# Patient Record
Sex: FEMALE | Race: WHITE
[De-identification: names, ages, dates, MRNs, and addresses within clinical notes are randomized per-mention and may not be internally consistent; named-entity substitution may affect disease eponyms.]

---

## 2019-09-08 ENCOUNTER — HOSPITAL ENCOUNTER (INPATIENT)
Dept: HOSPITAL 4 - SED | Age: 44
LOS: 2 days | Discharge: HOME | DRG: 74 | End: 2019-09-10
Attending: STUDENT IN AN ORGANIZED HEALTH CARE EDUCATION/TRAINING PROGRAM | Admitting: STUDENT IN AN ORGANIZED HEALTH CARE EDUCATION/TRAINING PROGRAM
Payer: COMMERCIAL

## 2019-09-08 VITALS — WEIGHT: 200 LBS | BODY MASS INDEX: 31.39 KG/M2 | HEIGHT: 67 IN

## 2019-09-08 VITALS — SYSTOLIC BLOOD PRESSURE: 155 MMHG

## 2019-09-08 VITALS — SYSTOLIC BLOOD PRESSURE: 143 MMHG

## 2019-09-08 VITALS — SYSTOLIC BLOOD PRESSURE: 137 MMHG

## 2019-09-08 DIAGNOSIS — F19.10: ICD-10-CM

## 2019-09-08 DIAGNOSIS — F10.10: ICD-10-CM

## 2019-09-08 DIAGNOSIS — R65.10: ICD-10-CM

## 2019-09-08 DIAGNOSIS — G90.8: Primary | ICD-10-CM

## 2019-09-08 DIAGNOSIS — F15.10: ICD-10-CM

## 2019-09-08 DIAGNOSIS — D50.9: ICD-10-CM

## 2019-09-08 DIAGNOSIS — E87.2: ICD-10-CM

## 2019-09-08 DIAGNOSIS — E86.0: ICD-10-CM

## 2019-09-08 DIAGNOSIS — I95.1: ICD-10-CM

## 2019-09-08 DIAGNOSIS — E66.9: ICD-10-CM

## 2019-09-08 DIAGNOSIS — E87.6: ICD-10-CM

## 2019-09-08 LAB
ALBUMIN SERPL BCP-MCNC: 4.1 G/DL (ref 3.4–4.8)
ALT SERPL W P-5'-P-CCNC: 26 U/L (ref 12–78)
AMPHETAMINES UR QL SCN: POSITIVE
AMYLASE SERPL-CCNC: 61 U/L (ref 0–100)
ANION GAP SERPL CALCULATED.3IONS-SCNC: 17 MMOL/L (ref 5–15)
APAP SERPL-MCNC: < 1 UG/ML (ref 1–30)
APPEARANCE UR: CLEAR
AST SERPL W P-5'-P-CCNC: 23 U/L (ref 10–37)
BACTERIA URNS QL MICRO: (no result) /HPF
BARBITURATES UR QL SCN: NEGATIVE
BASOPHILS # BLD AUTO: 0.1 K/UL (ref 0–0.2)
BASOPHILS NFR BLD AUTO: 0.3 % (ref 0–2)
BENZODIAZ UR QL SCN: NEGATIVE
BILIRUB SERPL-MCNC: 0.3 MG/DL (ref 0–1)
BILIRUB UR QL STRIP: NEGATIVE
BUN SERPL-MCNC: 5 MG/DL (ref 8–21)
BZE UR QL SCN: NEGATIVE
CALCIUM SERPL-MCNC: 9.4 MG/DL (ref 8.4–11)
CANNABINOIDS UR QL SCN: NEGATIVE
CHLORIDE SERPL-SCNC: 104 MMOL/L (ref 98–107)
COLOR UR: YELLOW
CREAT SERPL-MCNC: 0.8 MG/DL (ref 0.55–1.3)
EOSINOPHIL # BLD AUTO: 0.1 K/UL (ref 0–0.4)
EOSINOPHIL NFR BLD AUTO: 0.7 % (ref 0–4)
ERYTHROCYTE [DISTWIDTH] IN BLOOD BY AUTOMATED COUNT: 19.5 % (ref 9–15)
ETHANOL SERPL-MCNC: 51 MG/DL (ref ?–10)
GFR SERPL CREATININE-BSD FRML MDRD: 101 ML/MIN (ref 90–?)
GLUCOSE SERPL-MCNC: 102 MG/DL (ref 70–99)
GLUCOSE UR STRIP-MCNC: NEGATIVE MG/DL
HCG UR QL: NEGATIVE
HCT VFR BLD AUTO: 34.7 % (ref 36–48)
HGB BLD-MCNC: 10.7 G/DL (ref 12–16)
HGB UR QL STRIP: (no result)
KETONES UR STRIP-MCNC: NEGATIVE MG/DL
LEUKOCYTE ESTERASE UR QL STRIP: NEGATIVE
LIPASE SERPL-CCNC: 172 U/L (ref 73–393)
LYMPHOCYTES # BLD AUTO: 2.5 K/UL (ref 1–5.5)
LYMPHOCYTES NFR BLD AUTO: 14.8 % (ref 20.5–51.5)
MCH RBC QN AUTO: 22 PG (ref 27–31)
MCHC RBC AUTO-ENTMCNC: 31 % (ref 32–36)
MCV RBC AUTO: 71 FL (ref 79–98)
METHADONE UR-SCNC: NEGATIVE UMOL/L
METHAMPHET UR-SCNC: POSITIVE UMOL/L
MONOCYTES # BLD MANUAL: 0.7 K/UL (ref 0–1)
MONOCYTES # BLD MANUAL: 4.1 % (ref 1.7–9.3)
NEUTROPHILS # BLD AUTO: 13.7 K/UL (ref 1.8–7.7)
NEUTROPHILS NFR BLD AUTO: 80.1 % (ref 40–70)
NITRITE UR QL STRIP: NEGATIVE
OPIATES UR QL SCN: NEGATIVE
OXYCODONE SERPL-MCNC: NEGATIVE NG/ML
PCP UR QL SCN: NEGATIVE
PH UR STRIP: 7 [PH] (ref 5–8)
PLATELET # BLD AUTO: 358 K/UL (ref 130–430)
POTASSIUM SERPL-SCNC: 3.5 MMOL/L (ref 3.5–5.1)
PROT UR QL STRIP: NEGATIVE
RBC # BLD AUTO: 4.92 MIL/UL (ref 4.2–6.2)
RBC #/AREA URNS HPF: (no result) /HPF (ref 0–3)
SODIUM SERPLBLD-SCNC: 141 MMOL/L (ref 136–145)
SP GR UR STRIP: <1.005 (ref 1–1.03)
TRICYCLICS UR-MCNC: NEGATIVE NG/ML
URINE PROPOXYPHENE SCREEN: NEGATIVE
UROBILINOGEN UR STRIP-MCNC: 0.2 MG/DL (ref 0.2–1)
WBC # BLD AUTO: 17.1 K/UL (ref 4.8–10.8)
WBC #/AREA URNS HPF: (no result) /HPF (ref 0–3)

## 2019-09-08 PROCEDURE — G0378 HOSPITAL OBSERVATION PER HR: HCPCS

## 2019-09-08 PROCEDURE — G0480 DRUG TEST DEF 1-7 CLASSES: HCPCS

## 2019-09-08 PROCEDURE — G0481 DRUG TEST DEF 8-14 CLASSES: HCPCS

## 2019-09-08 PROCEDURE — G0482 DRUG TEST DEF 15-21 CLASSES: HCPCS

## 2019-09-08 RX ADMIN — ACETAMINOPHEN PRN MG: 325 TABLET ORAL at 18:07

## 2019-09-08 RX ADMIN — CEFTRIAXONE SODIUM SCH MLS/HR: 1 INJECTION, SOLUTION INTRAVENOUS at 21:49

## 2019-09-08 NOTE — NUR
CLOSING NOTES:

PATIENT AWAKE IN BED. ALERT AND ORIENTED x4 AND VERBAL. FAMILY AT BEDSIDE. NO SIGNS OF 
DISTRESS OR SHORTNESS OF BREATH NOTED. PATIENT IS TOLERATING OXYGEN AT ROOM AIR. SAFETY AND 
FALL PRECAUTIONS ARE IN PLACE. BED LOCKED IN LOWEST POSITION WITH CALL LIGHT IN REACH. WILL 
ENDORSE PATIENT  TO ONCOMING NIGHT SHIFT NURSE.

## 2019-09-08 NOTE — NUR
-------------------------------------------------------------------------------

           *** Note undone in [facility] - 09/08/19 at 1624 by [email or web address] ***            

-------------------------------------------------------------------------------

patient is othostatic positive

## 2019-09-08 NOTE — NUR
Patient will be admitted to care of [facility].  Admitted to tele unit.  Will go 
to room 117A.  Belongings list completed.  Summary report printed. Report will 
be given at bedside.

## 2019-09-08 NOTE — NUR
patient AOx4 with c/o dizziness, patient states she feels lightheaded/numbness 
to extremities upon standing  since 10:30 this morning. patient denies recent 
fall, n/v/d/HA of any kind. patient states she smokes x1 pack a day. patient 
denies any other drugs at this time. patient has equal keke strangth to all 
extremities. no slurred speech of any kind. no other complaint or injury at 
this time.

## 2019-09-08 NOTE — NUR
Medicated per MD orders. IVF infusing with no s/s of infiltration at this time. 
Will cont to monitor# 18 gauge angiocath placed to LAC.  Use of asceptic 
technique.  Opsite placed over site.  Blood return noted.  Blood for lab drawn 
from site.  Flushed with 10 cc of normal saline.  No evidence of infiltration 
noted.  Patient tolerated well.

## 2019-09-08 NOTE — NUR
ADMISSION NOTE

Received patient from ER via rachel, received report from Claudia GORDON. Patient admitted 
with diagnosis of ORTHOSTATIC HYPOTENSION/DEHYDRATION. Patient oriented to hospital routine, 
call light, toileting and safety-patient verbalized understanding.

## 2019-09-09 VITALS — SYSTOLIC BLOOD PRESSURE: 160 MMHG

## 2019-09-09 VITALS — SYSTOLIC BLOOD PRESSURE: 149 MMHG

## 2019-09-09 VITALS — SYSTOLIC BLOOD PRESSURE: 146 MMHG

## 2019-09-09 VITALS — SYSTOLIC BLOOD PRESSURE: 123 MMHG

## 2019-09-09 VITALS — SYSTOLIC BLOOD PRESSURE: 140 MMHG

## 2019-09-09 VITALS — SYSTOLIC BLOOD PRESSURE: 145 MMHG

## 2019-09-09 LAB
ANION GAP SERPL CALCULATED.3IONS-SCNC: 13 MMOL/L (ref 5–15)
BASOPHILS # BLD AUTO: 0.1 K/UL (ref 0–0.2)
BASOPHILS NFR BLD AUTO: 0.6 % (ref 0–2)
BUN SERPL-MCNC: 6 MG/DL (ref 8–21)
CALCIUM SERPL-MCNC: 8.4 MG/DL (ref 8.4–11)
CHLORIDE SERPL-SCNC: 109 MMOL/L (ref 98–107)
CHOLEST SERPL-MCNC: 150 MG/DL (ref ?–200)
CREAT SERPL-MCNC: 0.75 MG/DL (ref 0.55–1.3)
EOSINOPHIL # BLD AUTO: 0.4 K/UL (ref 0–0.4)
EOSINOPHIL NFR BLD AUTO: 4 % (ref 0–4)
ERYTHROCYTE [DISTWIDTH] IN BLOOD BY AUTOMATED COUNT: 19.8 % (ref 9–15)
GFR SERPL CREATININE-BSD FRML MDRD: 109 ML/MIN (ref 90–?)
GLUCOSE SERPL-MCNC: 90 MG/DL (ref 70–99)
HCT VFR BLD AUTO: 29.9 % (ref 36–48)
HDLC SERPL-MCNC: 36 MG/DL (ref 55–?)
HGB BLD-MCNC: 9.2 G/DL (ref 12–16)
LDL CHOLESTEROL: 95 MG/DL (ref ?–100)
LYMPHOCYTES # BLD AUTO: 3 K/UL (ref 1–5.5)
LYMPHOCYTES NFR BLD AUTO: 29.5 % (ref 20.5–51.5)
MCH RBC QN AUTO: 22 PG (ref 27–31)
MCHC RBC AUTO-ENTMCNC: 31 % (ref 32–36)
MCV RBC AUTO: 71 FL (ref 79–98)
MONOCYTES # BLD MANUAL: 0.6 K/UL (ref 0–1)
MONOCYTES # BLD MANUAL: 5.6 % (ref 1.7–9.3)
NEUTROPHILS # BLD AUTO: 6 K/UL (ref 1.8–7.7)
NEUTROPHILS NFR BLD AUTO: 60.3 % (ref 40–70)
PHOSPHATE SERPL-MCNC: 4.2 MG/DL (ref 2.7–4.5)
PLATELET # BLD AUTO: 285 K/UL (ref 130–430)
POTASSIUM SERPL-SCNC: 3.4 MMOL/L (ref 3.5–5.1)
RBC # BLD AUTO: 4.2 MIL/UL (ref 4.2–6.2)
SODIUM SERPLBLD-SCNC: 143 MMOL/L (ref 136–145)
TIBC SERPL-MCNC: 438 UG/DL (ref 250–450)
TRIGL SERPL-MCNC: 131 MG/DL (ref 30–150)
WBC # BLD AUTO: 10 K/UL (ref 4.8–10.8)

## 2019-09-09 RX ADMIN — SODIUM CHLORIDE SCH MLS/HR: 9 INJECTION, SOLUTION INTRAVENOUS at 09:26

## 2019-09-09 RX ADMIN — ACETAMINOPHEN PRN MG: 325 TABLET ORAL at 00:43

## 2019-09-09 RX ADMIN — Medication SCH TAB: at 09:25

## 2019-09-09 RX ADMIN — SODIUM CHLORIDE SCH MLS/HR: 9 INJECTION, SOLUTION INTRAVENOUS at 09:25

## 2019-09-09 RX ADMIN — MULTIVITAMIN TABLET SCH TAB: TABLET at 09:25

## 2019-09-09 RX ADMIN — ACETAMINOPHEN PRN MG: 325 TABLET ORAL at 15:04

## 2019-09-09 RX ADMIN — ACETAMINOPHEN PRN MG: 325 TABLET ORAL at 22:22

## 2019-09-09 RX ADMIN — CEFTRIAXONE SODIUM SCH MLS/HR: 1 INJECTION, SOLUTION INTRAVENOUS at 20:28

## 2019-09-09 NOTE — NUR
RN ROUNDS: 

PATIENT IS AWAKE AND ALERT IN BED. PATIENT DENIES ANY PAIN AT THE MOMENT. NO SIGNS OF 
DISTRESS OR SHORTNESS OF BREATH NOTED. IV SITE PATENT WITH NO SIGNS OF INFILTRATION. PATIENT 
IN STABLE CONDITION. SAFETY PRECAUTIONS ARE IN PLACE. WILL CONTINUE TO MONITOR PATIENT FOR 
ANY CHANGES.

## 2019-09-09 NOTE — NUR
RN ROUNDS:

PATIENT JUST CAME BACK IN FROM SMOKING OUTSIDE. PATIENT IS AWAKE IN BED. IV SITE IS NO 
LONGER PATENT. IV CATHETER INTACT WITH NO ACTIVE BLEEDING NOTED. WILL ATTEMPT TO PUT ANOTHER 
IV IN. PATIENT DENIES ANY PAIN AT THE MOMENT. NO SIGNS OF DISTRESS OR SHORTNESS OF BREATH 
NOTED. WILL CONTINUE TO MONITOR PATIENT FOR ANY CHANGES.

## 2019-09-09 NOTE — NUR
Pt back from smoking area. Pt in bed. No signs of acute distress noted. Call light within 
reach. Will continue to monitor.

## 2019-09-09 NOTE — NUR
RN ROUNDS:

PATIENT IS ASLEEP IN BED. NO SIGNS OF DISTRESS OR SHORTNESS OF BREATH NOTED. PATIENT IN 
STABLE CONDITION. WILL CONTINUE TO MONITOR PATIENT FOR ANY CHANGES.

## 2019-09-09 NOTE — NUR
Patient Reminded 

 again that stool sample is needed , Hat placed in Rest room patient alert & awake .

## 2019-09-09 NOTE — NUR
RN ROUNDS:

PATIENT IS AWAKE IN BED EATING LUNCH. FAMILY AT BEDSIDE. PATIENT DENIES ANY PAIN AT THE 
MOMENT. NO SIGNS OF DISTRESS OR SHORTNESS OF BREATH NOTED. PATIENT IN STABLE CONDITION. WILL 
CONTINUE TO MONITOR PATIENT FOR ANY CHANGES.

## 2019-09-09 NOTE — NUR
Hourly Rounding 

 patient Resting HOB elevated verbally Responsive friend at the bedside chest movement 
symmetrical call bell with patient .

## 2019-09-09 NOTE — NUR
Patient ambulates with steady gait 

 BRP did urinate as needed , back to bed no SOB call bell with patient activity tolerated .

## 2019-09-09 NOTE — NUR
ROCEPHIN 1 GM IVPB 

 administer as ordered no s/sx of ALLERGIC reaction noted skin Rash Free dry also warm .

## 2019-09-09 NOTE — NUR
IV REINSERTION:

IV SUCCESSFUL IN RIGHT FOREARM 22G. ASEPTIC TECHNIQUE USED. SUCCESSFUL AFTER THREE ATTEMPTS. 
PATIENT TOLERATED IT WELL. IV SITE PATENT WITH NO SIGNS OF INFILTRATION AND FLUSHES. NO 
ACTIVE BLEEDING NOTED. RUNNING IV FLUIDS AS ORDERED. WILL CONTINUE TO MONITOR.

## 2019-09-09 NOTE — NUR
: Pt. positive for etho, meth, and amphetamines. 

MSW met with pt. bedside, introduced self and asked pt. is she would like to continue with 
this interview and the asking of some personal questions with her two visitors in the room. 
Pt. stated she was fine and allowed them to stay. They were her two sisters, Patricia Molina 
[phone] and Pooja Avendaño [phone]. Pt. 



Pt. was able to easily participate in this interview. She stated she was at hospital due to 
feeling dizzy. She stated when asked, "there were no other reasons and the Dr. is trying to 
find out why".  Pt. stated she lives at home with her . She has four kids ages 
24,23,21 and 15 years. Pt. works at [employer] and stated she has the support of her family. MSW 
went through a discharge summary and continued to ask additional questions.



MSW shared with pt. her labs came back positive for the etoh, meth and amphet. Pt. stated 
she only drank 10 beers and smoked pot. When asked pt. stated she had never been Dx. with 
any type of mental health issues. She stated she has felt depressed at times, but that it 
has not been an issue. Pt. stated it was ok for MSW to leave some resources such as 
Substance Abuse and added her insurance can activate mental health services. MSW added,  her 
therapist by law has to keep the info she shares private and not share with anyone.  Lastly, 
it was explained to pt. what a durable power of attny was and a form was left with her. Pt. 
thanked MSW.

## 2019-09-09 NOTE — NUR
CLOSING ROUNDS:

PATIENT IS AWAKE AND ALERT x4 IN BED. FAMILY AT BEDSIDE. PATIENT DENIES ANY PAIN AT THE 
MOMENT. NO SIGNS OF DISTRESS OR SHORTNESS OF BREATH NOTED. IV SITE IS PATENT WITH NO SIGNS 
OF INFILTRATION. PATIENT IN STABLE CONDITION. SAFETY AND FALL PRECAUTIONS ARE IN PLACE. BED 
LOCKED IN LOWEST POSITION WITH CALL LIGHT IN REACH. WILL ENDORSE PATIENT TO NIGHT SHIFT 
NURSE.

## 2019-09-09 NOTE — NUR
OPENING NOTES:

RECEIVED PATIENT FROM NIGHT SHIFT NURSE. PATIENT IS AWAKE IN BED. ALERT AND ORIENTED x4. 
PATIENT DENIES ANY PAIN AT THE MOMENT. NO SIGNS OF DISTRESS OR SHORTNESS OF BREATH NOTED. 
SAFETY PRECAUTIONS ARE IN PLACE. BED LOCKED IN LOWEST POSITION WITH CALL LIGHT IN REACH. 
WILL CONTINUE TO MONITOR PATIENT FOR ANY CHANGES.

## 2019-09-09 NOTE — NUR
PM SHIFT ASSESSMENT

Pt is alert and oriented. Pt is on RA, RR even and unlabored. IV to right arm, no signs of 
infiltration noted. Skin warm and dry. Family at bedside. Safety precautions in place, call 
light within reach. Will continue to monitor.

## 2019-09-10 VITALS — SYSTOLIC BLOOD PRESSURE: 123 MMHG

## 2019-09-10 VITALS — SYSTOLIC BLOOD PRESSURE: 119 MMHG

## 2019-09-10 VITALS — SYSTOLIC BLOOD PRESSURE: 111 MMHG

## 2019-09-10 VITALS — SYSTOLIC BLOOD PRESSURE: 117 MMHG

## 2019-09-10 LAB
ANION GAP SERPL CALCULATED.3IONS-SCNC: 12 MMOL/L (ref 5–15)
BASOPHILS # BLD AUTO: 0.1 K/UL (ref 0–0.2)
BASOPHILS NFR BLD AUTO: 0.6 % (ref 0–2)
BUN SERPL-MCNC: 7 MG/DL (ref 8–21)
CALCIUM SERPL-MCNC: 8.4 MG/DL (ref 8.4–11)
CHLORIDE SERPL-SCNC: 109 MMOL/L (ref 98–107)
CREAT SERPL-MCNC: 0.68 MG/DL (ref 0.55–1.3)
EOSINOPHIL # BLD AUTO: 0.4 K/UL (ref 0–0.4)
EOSINOPHIL NFR BLD AUTO: 4.6 % (ref 0–4)
ERYTHROCYTE [DISTWIDTH] IN BLOOD BY AUTOMATED COUNT: 20 % (ref 9–15)
GFR SERPL CREATININE-BSD FRML MDRD: 122 ML/MIN (ref 90–?)
GLUCOSE SERPL-MCNC: 85 MG/DL (ref 70–99)
HCT VFR BLD AUTO: 29.1 % (ref 36–48)
HGB BLD-MCNC: 9 G/DL (ref 12–16)
LYMPHOCYTES # BLD AUTO: 2.1 K/UL (ref 1–5.5)
LYMPHOCYTES NFR BLD AUTO: 21.8 % (ref 20.5–51.5)
MCH RBC QN AUTO: 22 PG (ref 27–31)
MCHC RBC AUTO-ENTMCNC: 31 % (ref 32–36)
MCV RBC AUTO: 72 FL (ref 79–98)
MONOCYTES # BLD MANUAL: 0.7 K/UL (ref 0–1)
MONOCYTES # BLD MANUAL: 7.1 % (ref 1.7–9.3)
NEUTROPHILS # BLD AUTO: 6.3 K/UL (ref 1.8–7.7)
NEUTROPHILS NFR BLD AUTO: 65.9 % (ref 40–70)
PHOSPHATE SERPL-MCNC: 4.8 MG/DL (ref 2.7–4.5)
PLATELET # BLD AUTO: 249 K/UL (ref 130–430)
POTASSIUM SERPL-SCNC: 4.1 MMOL/L (ref 3.5–5.1)
RBC # BLD AUTO: 4.03 MIL/UL (ref 4.2–6.2)
SODIUM SERPLBLD-SCNC: 140 MMOL/L (ref 136–145)
WBC # BLD AUTO: 9.5 K/UL (ref 4.8–10.8)

## 2019-09-10 RX ADMIN — SODIUM CHLORIDE SCH MLS/HR: 9 INJECTION, SOLUTION INTRAVENOUS at 09:00

## 2019-09-10 RX ADMIN — MULTIVITAMIN TABLET SCH TAB: TABLET at 09:19

## 2019-09-10 RX ADMIN — ACETAMINOPHEN PRN MG: 325 TABLET ORAL at 09:20

## 2019-09-10 RX ADMIN — Medication SCH TAB: at 09:19

## 2019-09-10 NOTE — NUR
D/C Patient

Patient given medication reconciliation form and D/C instructions. Exit Care provided. 
Patient verbalized understanding. MD discussed with patient the results and treatment 
provided. Patient taken outside via wheelchair. Patient in stable condition, ID band 
removed. IV catheter removed, intact and dressing applied, no active bleeding. Rx of FERROUS 
SULFATE given. Patient educated on pain management. All belongings sent with patient.

## 2019-09-10 NOTE — NUR
Pt left room while RN was on lunch break. Pt found in cafeteria with significant other and 
smelled of cigarettes. Pt educated that all patients are not allowed in cafeteria and if she 
would like to leave her room she must first check in with her nurse and should be monitored 
on the tele at all times. Pt also educated that smoking times are over until morning. Pt 
agrees and understands. Will continue to monitor.

## 2019-09-10 NOTE — NUR
OPENING NOTES: 

RECEIVED PATIENT FROM NIGHT SHIFT NURSE. PATIENT IS ASLEEP IN BED. NO SIGNS OF DISTRESS OR 
SHORTNESS OF BREATH NOTED. IV SITE IS PATENT WITH NO SIGNS OF INFILTRATION. PATIENT IN 
STABLE CONDITION. SAFETY AND FALL PRECAUTIONS ARE IN PLACE. BED LOCKED IN LOWEST POSITION 
WITH CALL LIGHT IN REACH. WILL CONTINUE TO MONITOR PATIENT FOR ANY CHANGES.

## 2019-09-10 NOTE — NUR
RN ROUNDS:

PATIENT AWAKE AND ALERT x4 LAYING DOWN IN BED. PATIENT DENIES ANY PAIN AT THE MOMENT. NO 
SIGNS OF DISTRESS OR SHORTNESS OF BREATH NOTED. PATIENT STATES "JUST WAITING TO SEE THE 
DOCTOR SO I CAN GO HOME". PATIENT IN STABLE CONDITION. SAFETY AND FALL PRECAUTIONS ARE IN 
PLACE. BED LOCKED IN LOWEST POSITION WITH CALL LIGHT IN REACH. WILL CONTINUE TO MONITOR 
PATIENT FOR ANY CHANGES.

## 2019-09-10 NOTE — NUR
MD CALL

PAGED DR MENA IN REGARDS TO D/C OF PT, PT HAS QUESTIONS ABOUT D/C AND DOES NOT WANT TO 
LEAVE UNTIL SHE IS SEEN BY THE DOCTOR TODAY, MD WROTE ORDER FOR D/C THIS AM AT 0630. 
AWAITING CALL BACK. 

-------------------------------------------------------------------------------

Addendum: 09/10/19 at 0951 by Lamar Su RN

-------------------------------------------------------------------------------

SPOKE WITH MD AND SHE WILL COME IN AND SEE PT AROUND 10 AM, PT RECEIVED BANANA BAG YESTERDAY 
WITH THIAMINE, PER MD NO NEED TO GIVE THIS AGAIN TODAY.

## 2020-07-29 ENCOUNTER — HOSPITAL ENCOUNTER (EMERGENCY)
Dept: HOSPITAL 4 - SED | Age: 45
Discharge: HOME | End: 2020-07-29
Payer: COMMERCIAL

## 2020-07-29 VITALS — WEIGHT: 220 LBS | BODY MASS INDEX: 34.53 KG/M2 | HEIGHT: 67 IN

## 2020-07-29 VITALS — SYSTOLIC BLOOD PRESSURE: 150 MMHG

## 2020-07-29 DIAGNOSIS — R43.0: ICD-10-CM

## 2020-07-29 DIAGNOSIS — R43.2: ICD-10-CM

## 2020-07-29 DIAGNOSIS — Z20.828: ICD-10-CM

## 2020-07-29 DIAGNOSIS — R50.9: Primary | ICD-10-CM

## 2020-07-29 DIAGNOSIS — F17.200: ICD-10-CM

## 2020-07-29 NOTE — NUR
Patient came from home for evaluation of COVID-19 symptoms.  A family member 
that lives with them tested positive yesterday.

## 2020-07-29 NOTE — NUR
Patient given written and verbal discharge instructions and verbalizes 
understanding.  ER MD discussed with patient the results and treatment 
provided. Patient in stable condition. ID arm band removed. 

Rx of Dexamethasone,Zinc sulfate, Hydroxychloroquine and Azithromycin   given. 
Patient educated on pain management and to follow up with PMD. Pain Scale 0/10 
.

Opportunity for questions provided and answered. Medication side effect fact 
sheet provided.